# Patient Record
Sex: MALE | Race: ASIAN | NOT HISPANIC OR LATINO | URBAN - METROPOLITAN AREA
[De-identification: names, ages, dates, MRNs, and addresses within clinical notes are randomized per-mention and may not be internally consistent; named-entity substitution may affect disease eponyms.]

---

## 2017-04-17 ENCOUNTER — EMERGENCY (EMERGENCY)
Facility: HOSPITAL | Age: 62
LOS: 1 days | Discharge: HOME | End: 2017-04-17
Attending: EMERGENCY MEDICINE | Admitting: EMERGENCY MEDICINE
Payer: MEDICAID

## 2017-04-17 VITALS
RESPIRATION RATE: 20 BRPM | SYSTOLIC BLOOD PRESSURE: 124 MMHG | OXYGEN SATURATION: 100 % | HEART RATE: 80 BPM | DIASTOLIC BLOOD PRESSURE: 82 MMHG | TEMPERATURE: 98 F | HEIGHT: 60 IN

## 2017-04-17 VITALS
HEART RATE: 73 BPM | OXYGEN SATURATION: 100 % | RESPIRATION RATE: 17 BRPM | SYSTOLIC BLOOD PRESSURE: 125 MMHG | DIASTOLIC BLOOD PRESSURE: 90 MMHG

## 2017-04-17 LAB
ALBUMIN SERPL ELPH-MCNC: 4.3 G/DL — SIGNIFICANT CHANGE UP (ref 3.3–5)
ALP SERPL-CCNC: 57 U/L — SIGNIFICANT CHANGE UP (ref 40–120)
ALT FLD-CCNC: 17 U/L RC — SIGNIFICANT CHANGE UP (ref 10–45)
ANION GAP SERPL CALC-SCNC: 15 MMOL/L — SIGNIFICANT CHANGE UP (ref 5–17)
AST SERPL-CCNC: 30 U/L — SIGNIFICANT CHANGE UP (ref 10–40)
BASOPHILS # BLD AUTO: 0 K/UL — SIGNIFICANT CHANGE UP (ref 0–0.2)
BASOPHILS NFR BLD AUTO: 0.5 % — SIGNIFICANT CHANGE UP (ref 0–2)
BILIRUB SERPL-MCNC: 0.5 MG/DL — SIGNIFICANT CHANGE UP (ref 0.2–1.2)
BUN SERPL-MCNC: 15 MG/DL — SIGNIFICANT CHANGE UP (ref 7–23)
CALCIUM SERPL-MCNC: 9.4 MG/DL — SIGNIFICANT CHANGE UP (ref 8.4–10.5)
CHLORIDE SERPL-SCNC: 102 MMOL/L — SIGNIFICANT CHANGE UP (ref 96–108)
CO2 SERPL-SCNC: 23 MMOL/L — SIGNIFICANT CHANGE UP (ref 22–31)
CREAT SERPL-MCNC: 0.89 MG/DL — SIGNIFICANT CHANGE UP (ref 0.5–1.3)
EOSINOPHIL # BLD AUTO: 0.4 K/UL — SIGNIFICANT CHANGE UP (ref 0–0.5)
EOSINOPHIL NFR BLD AUTO: 4.2 % — SIGNIFICANT CHANGE UP (ref 0–6)
GAS PNL BLDV: SIGNIFICANT CHANGE UP
GLUCOSE SERPL-MCNC: 118 MG/DL — HIGH (ref 70–99)
HCT VFR BLD CALC: 45 % — SIGNIFICANT CHANGE UP (ref 39–50)
HGB BLD-MCNC: 15.2 G/DL — SIGNIFICANT CHANGE UP (ref 13–17)
LIDOCAIN IGE QN: 30 U/L — SIGNIFICANT CHANGE UP (ref 7–60)
LYMPHOCYTES # BLD AUTO: 2.4 K/UL — SIGNIFICANT CHANGE UP (ref 1–3.3)
LYMPHOCYTES # BLD AUTO: 26.3 % — SIGNIFICANT CHANGE UP (ref 13–44)
MCHC RBC-ENTMCNC: 31.7 PG — SIGNIFICANT CHANGE UP (ref 27–34)
MCHC RBC-ENTMCNC: 33.9 GM/DL — SIGNIFICANT CHANGE UP (ref 32–36)
MCV RBC AUTO: 93.6 FL — SIGNIFICANT CHANGE UP (ref 80–100)
MONOCYTES # BLD AUTO: 0.6 K/UL — SIGNIFICANT CHANGE UP (ref 0–0.9)
MONOCYTES NFR BLD AUTO: 6.1 % — SIGNIFICANT CHANGE UP (ref 2–14)
NEUTROPHILS # BLD AUTO: 5.7 K/UL — SIGNIFICANT CHANGE UP (ref 1.8–7.4)
NEUTROPHILS NFR BLD AUTO: 63 % — SIGNIFICANT CHANGE UP (ref 43–77)
PLATELET # BLD AUTO: 176 K/UL — SIGNIFICANT CHANGE UP (ref 150–400)
POTASSIUM SERPL-MCNC: 4.7 MMOL/L — SIGNIFICANT CHANGE UP (ref 3.5–5.3)
POTASSIUM SERPL-SCNC: 4.7 MMOL/L — SIGNIFICANT CHANGE UP (ref 3.5–5.3)
PROT SERPL-MCNC: 7.1 G/DL — SIGNIFICANT CHANGE UP (ref 6–8.3)
RBC # BLD: 4.81 M/UL — SIGNIFICANT CHANGE UP (ref 4.2–5.8)
RBC # FLD: 13.1 % — SIGNIFICANT CHANGE UP (ref 10.3–14.5)
SODIUM SERPL-SCNC: 140 MMOL/L — SIGNIFICANT CHANGE UP (ref 135–145)
TROPONIN T SERPL-MCNC: <0.01 NG/ML — SIGNIFICANT CHANGE UP (ref 0–0.06)
WBC # BLD: 9.1 K/UL — SIGNIFICANT CHANGE UP (ref 3.8–10.5)
WBC # FLD AUTO: 9.1 K/UL — SIGNIFICANT CHANGE UP (ref 3.8–10.5)

## 2017-04-17 PROCEDURE — 82435 ASSAY OF BLOOD CHLORIDE: CPT

## 2017-04-17 PROCEDURE — 71020: CPT | Mod: 26

## 2017-04-17 PROCEDURE — 82803 BLOOD GASES ANY COMBINATION: CPT

## 2017-04-17 PROCEDURE — 84295 ASSAY OF SERUM SODIUM: CPT

## 2017-04-17 PROCEDURE — 84484 ASSAY OF TROPONIN QUANT: CPT

## 2017-04-17 PROCEDURE — 99285 EMERGENCY DEPT VISIT HI MDM: CPT | Mod: 25

## 2017-04-17 PROCEDURE — 84132 ASSAY OF SERUM POTASSIUM: CPT

## 2017-04-17 PROCEDURE — 80053 COMPREHEN METABOLIC PANEL: CPT

## 2017-04-17 PROCEDURE — 85027 COMPLETE CBC AUTOMATED: CPT

## 2017-04-17 PROCEDURE — 93005 ELECTROCARDIOGRAM TRACING: CPT

## 2017-04-17 PROCEDURE — 85014 HEMATOCRIT: CPT

## 2017-04-17 PROCEDURE — 96374 THER/PROPH/DIAG INJ IV PUSH: CPT

## 2017-04-17 PROCEDURE — 82330 ASSAY OF CALCIUM: CPT

## 2017-04-17 PROCEDURE — 71046 X-RAY EXAM CHEST 2 VIEWS: CPT

## 2017-04-17 PROCEDURE — 99284 EMERGENCY DEPT VISIT MOD MDM: CPT | Mod: 25

## 2017-04-17 PROCEDURE — 83605 ASSAY OF LACTIC ACID: CPT

## 2017-04-17 PROCEDURE — 83690 ASSAY OF LIPASE: CPT

## 2017-04-17 PROCEDURE — 82947 ASSAY GLUCOSE BLOOD QUANT: CPT

## 2017-04-17 PROCEDURE — 82565 ASSAY OF CREATININE: CPT

## 2017-04-17 PROCEDURE — 93010 ELECTROCARDIOGRAM REPORT: CPT

## 2017-04-17 RX ORDER — FAMOTIDINE 10 MG/ML
20 INJECTION INTRAVENOUS ONCE
Qty: 0 | Refills: 0 | Status: COMPLETED | OUTPATIENT
Start: 2017-04-17 | End: 2017-04-17

## 2017-04-17 RX ORDER — SODIUM CHLORIDE 9 MG/ML
3 INJECTION INTRAMUSCULAR; INTRAVENOUS; SUBCUTANEOUS ONCE
Qty: 0 | Refills: 0 | Status: COMPLETED | OUTPATIENT
Start: 2017-04-17 | End: 2017-04-17

## 2017-04-17 RX ORDER — LIDOCAINE 4 G/100G
10 CREAM TOPICAL ONCE
Qty: 0 | Refills: 0 | Status: COMPLETED | OUTPATIENT
Start: 2017-04-17 | End: 2017-04-17

## 2017-04-17 RX ADMIN — Medication 30 MILLILITER(S): at 23:09

## 2017-04-17 RX ADMIN — SODIUM CHLORIDE 3 MILLILITER(S): 9 INJECTION INTRAMUSCULAR; INTRAVENOUS; SUBCUTANEOUS at 23:12

## 2017-04-17 RX ADMIN — LIDOCAINE 10 MILLILITER(S): 4 CREAM TOPICAL at 23:09

## 2017-04-17 RX ADMIN — FAMOTIDINE 20 MILLIGRAM(S): 10 INJECTION INTRAVENOUS at 23:11

## 2017-04-17 NOTE — ED PROVIDER NOTE - INTERPRETATION
EKG reviewed for rate, rhythm, axis, intervals and segments, including QRS morphology, P wave appearance T wave appearance, VA interval, and QT interval.  I find the EKG to be unremarkable in all of these regards except as follows: iRBBB

## 2017-04-17 NOTE — ED ADULT NURSE NOTE - OBJECTIVE STATEMENT
Pt is a 63 yo M c/o of chest pain that began earlier today, located in epigastric area. No radiation noted. Pain is intermittent. Does not increase on exertion. NO NVD. No dizziness weakness, or SOB. Denies Diaphoresis. No PMH. Abdomen soft and non tender. Denies Constipation. Denies  distress. Gross neuro intact. Family at bedside. Labs drawn and sent. MD at bedside. Medication given as ordered.

## 2017-04-17 NOTE — ED ADULT NURSE NOTE - CHPI ED SYMPTOMS NEG
no abdominal distension/no blood in stool/no diarrhea/no vomiting/no hematuria/no nausea/no burning urination/no chills/no dysuria/no fever

## 2017-04-17 NOTE — ED PROVIDER NOTE - CARE PLAN
Principal Discharge DX:	Epigastric pain  Instructions for follow-up, activity and diet:	1. Return to ED for worsening, progressive or any other concerning symptoms   2. Follow up with your primary care doctor in 2-3days   3. Take 20mg of pepcid twice a day   4. Take Tylenol up to 650 mg every 6 hours as needed for pain.   5. You can purchase Maalox over the counter and take as needed  Secondary Diagnosis:	Chest pain with minimal risk for cardiac etiology Principal Discharge DX:	Epigastric pain  Instructions for follow-up, activity and diet:	1. Return to ED for worsening, progressive or any other concerning symptoms   2. Follow up with your primary care doctor in 2-3days   3. Take 20mg of pepcid twice a day   4. Take Tylenol up to 650 mg every 6 hours as needed for pain.   5. You can purchase Maalox over the counter and take as needed  6. Follow up with the GI clinic [phone]  Secondary Diagnosis:	Chest pain with minimal risk for cardiac etiology Principal Discharge DX:	Epigastric pain  Instructions for follow-up, activity and diet:	1. Return to ED for worsening, progressive or any other concerning symptoms   2. Follow up with your primary care doctor in 2-3days   3. Take 20mg of pepcid twice a day   4. Take Tylenol up to 650 mg every 6 hours as needed for pain.   5. You can purchase Maalox over the counter and take as needed  6. Follow up with the GI clinic [phone]  Secondary Diagnosis:	Chest pain with minimal risk for cardiac etiology

## 2017-04-17 NOTE — ED PROVIDER NOTE - MEDICAL DECISION MAKING DETAILS
potential abd etiology of pain, vs early URI. low suspicion for cardiac etiology with all the other associated sx and persistent pain x 4days. no signs/sx PE no risk factors. will send labs with 1 trop. GI cocktail reasses

## 2017-04-17 NOTE — ED PROVIDER NOTE - PHYSICAL EXAMINATION
Gen: NAD, AOx3  Head: NCAT  HEENT: PERRL, oral mucosa moist, normal conjunctiva, neck supple  Lung: CTAB, no respiratory distress  CV: rrr, no murmur, Normal perfusion  Abd: soft, +epigastric ttp w/o rebound/guarding, n omurphys, ND, no CVA tenderness  MSK: No edema, no visible deformities  Neuro: No focal neurologic deficits  Skin: No rash   Psych: normal affect

## 2017-04-17 NOTE — ED PROVIDER NOTE - OBJECTIVE STATEMENT
63yo M with chest pain over last 3-4 days, constant midsternal nonradiating. now with frontal HA. no nausea, vomiting. no diaphoresis. no arm pain/weakness. pain not exertional. + SOB. no dizziness/lightheaded. no cough. +subjective fever. chills. no recent illness. + sick contacts with flu. +sore throat. +congestion. mild intermittent diarrhea. no recent travel. +upper abd pain not worse with food- abd pain started first then CP.     ROS: no n/v/c. no rash. no bleeding. no urinary complaints. no weakness. no vision changes. no neck/back pain. no extremity swelling.     PMH- none  meds - none  PSH- lumbar disc sx.

## 2017-04-17 NOTE — ED PROVIDER NOTE - PLAN OF CARE
1. Return to ED for worsening, progressive or any other concerning symptoms   2. Follow up with your primary care doctor in 2-3days   3. Take 20mg of pepcid twice a day   4. Take Tylenol up to 650 mg every 6 hours as needed for pain.   5. You can purchase Maalox over the counter and take as needed 1. Return to ED for worsening, progressive or any other concerning symptoms   2. Follow up with your primary care doctor in 2-3days   3. Take 20mg of pepcid twice a day   4. Take Tylenol up to 650 mg every 6 hours as needed for pain.   5. You can purchase Maalox over the counter and take as needed  6. Follow up with the GI clinic [phone]

## 2017-04-17 NOTE — ED ADULT NURSE NOTE - CAS EDN DISCHARGE ASSESSMENT
No acute distress observed. Denies pain.  Ambulates with steady gait/Awake/Alert and oriented to person, place and time

## 2017-04-17 NOTE — ED PROVIDER NOTE - ATTENDING CONTRIBUTION TO CARE
63 yo male with constant atypical chest discomfort x 3-4d.  Nontoxic appearing.  Also with headache, chills.  Normal troponin in the setting of several days with constant pain and unremarkable EKG -- not ACS.  Labs normal.  Lactate 1.1.  Afebrile.  Outpatient follow-up.

## 2017-04-18 RX ORDER — FLUTICASONE PROPIONATE AND SALMETEROL 50; 250 UG/1; UG/1
0 POWDER ORAL; RESPIRATORY (INHALATION)
Qty: 0 | Refills: 0 | COMMUNITY

## 2017-04-21 DIAGNOSIS — R10.13 EPIGASTRIC PAIN: ICD-10-CM

## 2017-04-21 DIAGNOSIS — R50.9 FEVER, UNSPECIFIED: ICD-10-CM

## 2017-04-21 DIAGNOSIS — R07.89 OTHER CHEST PAIN: ICD-10-CM
